# Patient Record
Sex: MALE | Race: WHITE
[De-identification: names, ages, dates, MRNs, and addresses within clinical notes are randomized per-mention and may not be internally consistent; named-entity substitution may affect disease eponyms.]

---

## 2018-03-16 ENCOUNTER — HOSPITAL ENCOUNTER (EMERGENCY)
Dept: HOSPITAL 97 - ER | Age: 83
Discharge: HOME | End: 2018-03-16
Payer: COMMERCIAL

## 2018-03-16 DIAGNOSIS — I10: ICD-10-CM

## 2018-03-16 DIAGNOSIS — Z79.82: ICD-10-CM

## 2018-03-16 DIAGNOSIS — Z95.818: ICD-10-CM

## 2018-03-16 DIAGNOSIS — E78.5: ICD-10-CM

## 2018-03-16 DIAGNOSIS — N45.2: Primary | ICD-10-CM

## 2018-03-16 LAB
ALBUMIN SERPL BCP-MCNC: 3.9 G/DL (ref 3.2–5.5)
ALP SERPL-CCNC: 62 IU/L (ref 42–121)
ALT SERPL W P-5'-P-CCNC: 27 IU/L (ref 10–60)
AST SERPL W P-5'-P-CCNC: 27 IU/L (ref 10–42)
BUN BLD-MCNC: 23 MG/DL (ref 6–20)
GLUCOSE SERPLBLD-MCNC: 96 MG/DL (ref 65–120)
HCT VFR BLD CALC: 48 % (ref 39.6–49)
LYMPHOCYTES # SPEC AUTO: 1.2 K/UL (ref 0.7–4.9)
MCH RBC QN AUTO: 30.4 PG (ref 27–35)
MCV RBC: 90.3 FL (ref 80–100)
PMV BLD: 8.2 FL (ref 7.6–11.3)
POTASSIUM SERPL-SCNC: 4.2 MEQ/L (ref 3.6–5)
RBC # BLD: 5.31 M/UL (ref 4.33–5.43)
UA COMPLETE W REFLEX CULTURE PNL UR: (no result)
UA DIPSTICK W REFLEX MICRO PNL UR: (no result)

## 2018-03-16 PROCEDURE — 85025 COMPLETE CBC W/AUTO DIFF WBC: CPT

## 2018-03-16 PROCEDURE — 81015 MICROSCOPIC EXAM OF URINE: CPT

## 2018-03-16 PROCEDURE — 81003 URINALYSIS AUTO W/O SCOPE: CPT

## 2018-03-16 PROCEDURE — 36415 COLL VENOUS BLD VENIPUNCTURE: CPT

## 2018-03-16 PROCEDURE — 76870 US EXAM SCROTUM: CPT

## 2018-03-16 PROCEDURE — 99284 EMERGENCY DEPT VISIT MOD MDM: CPT

## 2018-03-16 PROCEDURE — 80053 COMPREHEN METABOLIC PANEL: CPT

## 2018-03-16 NOTE — XMS REPORT
Patient Summary Document

 Created on:2018



Patient:ALYSIA RUBIN

Sex:Male

:1935

External Reference #:[id number]





Demographics







 Address   [address]



   [address]



   [place] [address]

 

 Home Phone  [phone]

 

 Mobile Phone  [phone]

 

 Email Address  [email or web address]

 

 Preferred Language  Unknown

 

 Marital Status  Unknown

 

 [employer] Affiliation  Unknown

 

 Race  Unknown

 

 Additional Race(s)  Unavailable

 

 Ethnic Group  Unknown









Author







 Organization  [facility]

 

 Address  [address]



   [place] [address]

 

 Phone  [phone]









Care Team Providers







 Name  Role  Phone

 

 UNKNOWN,  REFFERING  Primary Care Provider  Unavailable

 

 MADELIN GALLARDO  Unavailable  Unavailable









Problems

This patient has no known problems.



Allergies, Adverse Reactions, Alerts

This patient has no known allergies or adverse reactions.



Medications

This patient has no known medications.



Results







 Test Description  Test Time  Test Comments  Text Results  Atomic Results  
Result Comments









 Prostatic Specific Ag. (PSA)  2017 20:50:00    









   Test Item  Value  Reference Range  Comments









 PSA (test code=PSA)  8.02 ng/mL  0.000-6.220  (NOTE)Prostate Specific Antigen 
Test



       Information:The Total PSA method is



       approved for use as an aid in the



       detection ofprostate cancer when used in



       conjunction with a digital rectal exam



       inmen age 50 and older. The total PSA



       method is also indicated for theserial



       measurement of PSA to aid in the prognosis



       and management ofprostate cancer



       patients.Elevated PSA concentrations can



       only suggest the presence of



       prostatecancer until biopsy is performed.



       PSA concentration can also be elevatedin



       benign prostatic hyperplasia or



       inflammatory conditions of theprostate.



       PSA is generally not elevated in healthy



       men or men withnon-prostatic carcinoma.



Thyroid Stimulating Hormone (TSH)2017 20:49:00





 Test Item  Value  Reference Range  Comments

 

 TSH (test code=TSH)  2.05 mIU/mL  0.270-4.200  



Lipid [id number] 20:46:00





 Test Item  Value  Reference Range  Comments

 

 Cholesterol (test  152 mg/dL  0-200  



 code=CHOL)      

 

 Triglycerides (test  291 mg/dL  9-200  



 code=TRIG)      

 

 HDL (test code=HDL)  35 mg/dL  40-60  

 

 Chol/HDL (test  4.3 Ratio  0.0-5.0  



 code=CHOLPHDL)      

 

 LDL, Calculated (test  59  0-130  (NOTE)RISK OF HEART



 code=LDLC)      DISEASEPublished by American



       Heart AssociationAnalyte



               Optimal



       Boderline            Increased



       RiskCHOL



       &lt;200                 200-239



                        &gt;240TRIG



                      &lt;150



             150-199



       &gt;200HDL Male:



       &gt;60



                             &lt;40HDL



       Female:       &gt;60



       



            &lt;50LDL



         &lt;100                130-159



                          &gt;160LDL



                         NEAR OPTIMAL



       -129

 

 VLDL (test code=VLDL)  58 mg/dL  5-40  

 

 LDL/HDL (test code=LDLPHDL)  2    



Comprehensive Metabolic [id number] 20:46:00





 Test Item  Value  Reference Range  Comments

 

 Sodium (test code=NA)  133 mmol/L  135-145  

 

 Potassium (test code=K)  4.5 mmol/L  3.5-5.1  

 

 Chloride (test code=CL)  98 mmol/L    

 

 Carbon Dioxide (test  21 mmol/L  22-29  



 code=CO2)      

 

 Glucose (test code=GLU)  115 mg/dL    

 

 Blood Urea Nitrogen  27 mg/dL  8-23  



 (test code=BUN)      

 

 Creatinine (test  1.2 mg/dL  0.7-1.2  



 code=CREAT)      

 

 Calcium (test code=CA)  9.5 mg/dL  8.3-10.5  

 

 Prot Total (test  6.4 g/dL  6.4-8.3  



 code=TP)      

 

 Albumin (test code=ALB)  4.2 g/dL  3.5-5.2  

 

 A/G Ratio (test  1.9 Ratio    



 code=AGRATIO)      

 

 Globulin (test  2.2  2.9-3.1  



 code=GLOB)      

 

 Bili Total (test  0.6 mg/dL  0.1-0.9  



 code=TBIL)      

 

 Alk Phos (test  72 U/L    



 code=APHOS)      

 

 AST (test code=AST)  19 U/L  1-40  

 

 ALT (test code=ALT)  21 U/L  1-41  

 

 BUN/Creatinine Ratio  22.5    



 (test code=BCRATIO)      

 

 Anion Gap (test  14 mmol/L  7-16  



 code=AGAP)      

 

 Estimated GFR (test  &gt;60    eGFR (estimated Glomerular



 code=GFR)  mL/min/1.73m2    Filtration Rate) is an



       estimated value,calculated



       from the patient's serum



       creatinine using the MDRD



       equation.It is NOT the



       patient's actual GFR. The



       eGFR provides a more



       clinicallyuseful measure



       of kidney disease than



       serum creatinine



       alone.***This calculation



       takes sex and race into



       account, if the



       informationis provided. If



       the race is not provided,



       and the patient



       isAfrican-American,



       multiply by 1.212. If sex



       is not provided, and



       thepatient is female,



       multiply by 0.742. Results



       for patients &lt;18 years



       ofage have not been



       validated by the MDRD



       study and should be



       interpretedwith



       caution.eGFR Result



       Interpretation:eGFR &gt;



       or=60 is in the Normal



       RangeeGFR &lt; 60 may mean



       kidney diseaseeGFR &lt; 15



       may mean kidney



       failure***Ranges



       recommended by the



       National Kidney



       Foundation,http://nkdep.ni



       h.gov



CBC with [id number] 20:06:00





 Test Item  Value  Reference Range  Comments

 

 WBC (test code=WBC)  8.6 K/cumm  4.4-10.5  

 

 RBC (test code=RBC)  4.98 M/cumm  4.10-5.70  

 

 Hemoglobin (test code=HGB)  15.1 gm/dL  13.4-17.4  

 

 Hematocrit (test code=HCT)  45.2 %  38.7-52.0  

 

 MCV (test code=MCV)  90.8 fL    

 

 MCH (test code=MCH)  30.2 pg  27.0-32.5  

 

 MCHC (test code=MCHC)  33.3 g/dL  32.0-37.5  

 

 RDW (test code=RDW)  14.7 %  11.5-14.5  

 

 Platelet Count (test code=PLTCT)  230 K/cumm  140-440  

 

 MPV (test code=MPV)  8.6 fL    

 

 Diff Method (test code=DIFFM)  Auto    

 

 Neutrophil (test code=NEUT)  68.8 %  36-70  

 

 Lymphocyte (test code=LYMPH)  15.4 %  12-44  

 

 Monocyte (test code=MONO)  11.2 %  0-11  

 

 Eosinophil (test code=EOS)  3.7 %  0-7  

 

 Basophil (test code=BASO)  0.9 %  0-2  

 

 Neutro Abs (test code=ANEUT)  5.9 K/cumm  1.6-7.4  

 

 Lymph Abs (test code=ALYMPH)  1.3 K/cumm  0.5-4.6  

 

 Mono Abs (test code=AMONO)  1.0 K/cumm  0.0-1.2  

 

 Eos Abs (test code=AEOS)  0.31 K/cumm  0.00-0.74  

 

 Baso Abs (test code=ABASO)  0.1 K/cumm  0.00-0.21

## 2018-03-16 NOTE — EDPHYS
Physician Documentation                                                                           

 [facility]                                                                

Name: Christian Gomez                                                                                 

Age: 82 yrs                                                                                       

Sex: Male                                                                                         

: 1935                                                                                   

MRN: [id number]                                                                                   

Arrival Date: 2018                                                                          

Time: 12:30                                                                                       

Account#: [id number]                                                                            

Bed 24                                                                                            

Private MD:                                                                                       

ED Physician Hardy Gregg                                                                    

HPI:                                                                                              

                                                                                             

15:52 This 82 yrs old  Male presents to ER via Ambulatory with complaints of         ma2 

      Testicular Pain.                                                                            

15:52 The patient presents with tenderness, that is mild, of the left testicle. Onset: The    ma2 

      symptoms/episode began/occurred gradually, 2 day(s) ago. Associated signs and symptoms:     

      Pertinent positives: Pertinent negatives: abdominal pain, constipation, diarrhea,           

      dysuria, fever, hematuria, nausea. Severity of symptoms: At their worst the symptoms        

      were mild. hx of prostate ca, had right testicular pain for 2 weeks that resolved and       

      now with left testicular pain x 2 days no swelling no inguinal swelling or mass .           

                                                                                                  

Historical:                                                                                       

- Allergies:                                                                                      

12:41 No Known Allergies;                                                                     tw2 

- Home Meds:                                                                                      

12:41 clopidogrel 75 mg oral tab 1 tab once daily [Active]; metoprolol tartrate 50 mg Oral    tw2 

      tab 2 tabs once daily [Active]; lisinopril 40 mg Oral tab 1 tab once daily [Active];        

      pantoprazole 40 mg oral TbEC 1 tab once daily [Active]; atorvastatin 80 mg oral tab 1       

      tab once daily [Active]; aspirin 325 mg Oral TbEC 1 tab once daily [Active]; isosorbide     

      mononitrate 30 mg Oral Tb24 1 tab once daily [Active];                                      

- PMHx:                                                                                           

12:41 Hypertension; Hyperlipidemia;                                                           tw2 

12:42 Heart attacks;                                                                          tw2 

- PSHx:                                                                                           

12:41 heart cath with stents; Tonsillectomy;                                                  tw2 

                                                                                                  

- Immunization history:: Adult Immunizations up to date.                                          

- Social history:: Smoking status: Patient/guardian denies using tobacco.                         

                                                                                                  

                                                                                                  

ROS:                                                                                              

15:52 Constitutional: Negative for fever, chills, and weight loss, Eyes: Negative for injury, ma2 

      pain, redness, and discharge, ENT: Negative for injury, pain, and discharge, Neck:          

      Negative for injury, pain, and swelling, Cardiovascular: Negative for chest pain,           

      palpitations, and edema, Respiratory: Negative for shortness of breath, cough,              

      wheezing, and pleuritic chest pain, Abdomen/GI: Negative for abdominal pain, nausea,        

      diarrhea, and constipation, Back: Negative for injury and pain, MS/Extremity: Negative      

      for injury and deformity, Skin: Negative for injury, rash, and discoloration, Neuro:        

      Negative for headache, weakness, numbness, tingling, and seizure, Psych: Negative for       

      depression, anxiety, suicide ideation, homicidal ideation, and hallucinations,              

      Allergy/Immunology: Negative for hives, rash, and allergies, Endocrine: Negative for        

      neck swelling, polydipsia, polyuria, polyphagia, and marked weight changes.                 

                                                                                                  

Exam:                                                                                             

15:52 Constitutional:  This is a well developed, well nourished patient who is awake, alert,  ma2 

      and in no acute distress. Head/Face:  Normocephalic, atraumatic. Chest/axilla:  Normal      

      chest wall appearance and motion.  Nontender with no deformity.  No lesions are             

      appreciated. Cardiovascular:  Regular rate and rhythm with a normal S1 and S2.  No          

      gallops, murmurs, or rubs.  Normal PMI, no JVD.  No pulse deficits. Respiratory:  Lungs     

      have equal breath sounds bilaterally, clear to auscultation and percussion.  No rales,      

      rhonchi or wheezes noted.  No increased work of breathing, no retractions or nasal          

      flaring. Abdomen/GI:  Soft, non-tender, with normal bowel sounds.  No distension or         

      tympany.  No guarding or rebound.  No evidence of tenderness throughout. Male :           

      right tisticular tenderness, no swelling no skin rash  with no discharge or lesions.        

                                                                                                  

Vital Signs:                                                                                      

12:37  / 80; Pulse 59; Resp 16; Temp 97.9(O); Pulse Ox 100% on R/A; Weight 90.72 kg     tw2 

      (R); Height 5 ft. 7 in. (170.18 cm); Pain 4/10;                                             

14:00  / 82; Pulse 58; Resp 16; Pulse Ox 99% on R/A;                                    kr2 

15:10  / 75; Pulse 60; Resp 17; Pulse Ox 99% on R/A;                                    kr2 

16:25  / 71; Pulse 60; Resp 17; Pulse Ox 99% on R/A;                                    kr2 

12:37 Body Mass Index 31.32 (90.72 kg, 170.18 cm)                                             tw2 

                                                                                                  

MDM:                                                                                              

13:08 Patient medically screened.                                                             [facility] 

15:52 Differential diagnosis: UTI, prostatitis, urethritis, torsion, orchitis vs epidydmitis. ma2 

      Data reviewed: vital signs, nurses notes, EMS record, radiologic studies. Counseling: I     

      had a detailed discussion with the patient and/or guardian regarding: the historical        

      points, exam findings, and any diagnostic results supporting the discharge/admit            

      diagnosis, the presence of at least one elevated blood pressure reading (>120/80)           

      during this emergency department visit, the need for outpatient follow up.                  

                                                                                                  

                                                                                             

13:53 Order name: UA                                                                          [facility] 

                                                                                             

13:53 Order name: CMP                                                                         [facility] 

                                                                                             

13:53 Order name: CBC with Diff                                                               [facility] 

                                                                                             

14:11 Order name: Urine Dipstick-Ancillary; Complete Time: 14:35                              EDMS

                                                                                             

14:42 Order name: Urinalysis; Complete Time: 15:51                                            EDMS

                                                                                             

14:56 Order name: CBC with Automated Diff; Complete Time: 15:51                               EDMS

                                                                                             

13:53 Order name: US Scrotum Testicles                                                        [facility] 

                                                                                             

15:07 Order name: Comprehensive Metabolic Panel; Complete Time: 15:51                         EDMS

                                                                                             

15:11 Order name: Urine Microscopic Only; Complete Time: 15:51                                EDMS

                                                                                             

15:51 Order name: US; Complete Time: 15:51                                                    EDMS

                                                                                                  

Administered Medications:                                                                         

No medications were administered                                                                  

                                                                                                  

                                                                                                  

Disposition:                                                                                      

18 15:57 Discharged to Home. Impression: Orchitis.                                          

- Condition is Stable.                                                                            

- Discharge Instructions: [place].                                                               

- Prescriptions for Tylenol- Codeine #3 300-30 mg Oral Tablet - take 2 tablet by ORAL             

  route every 6 hours As needed; 6 tablet. Cipro 500 mg Oral Tablet - take 1 tablet by            

  ORAL route every 12 hours for 7 days; 14 tablet.                                                

- Medication Reconciliation Form, Thank You Letter, Antibiotic Education, Prescription            

  Opioid Use form.                                                                                

- Follow up: Private Physician; When: Tomorrow; Reason: Continuance of care.                      

- Problem is new.                                                                                 

- Symptoms have improved.                                                                         

                                                                                                  

                                                                                                  

                                                                                                  

Signatures:                                                                                       

Dispatcher [employer]                           EDMS                                                 

Radha Valdovinos RN                          RN   tw2                                                  

Mariya Chawla RN                       RN   kr2                                                  

Hardy Gregg MD MD   ma2                                                  

                                                                                                  

**************************************************************************************************

## 2018-03-16 NOTE — ER
Nurse's Notes                                                                                     

 [facility]                                                                

Name: Christian Gomez                                                                                 

Age: 82 yrs                                                                                       

Sex: Male                                                                                         

: 1935                                                                                   

MRN: [id number]                                                                                   

Arrival Date: 2018                                                                          

Time: 12:30                                                                                       

Account#: [id number]                                                                            

Bed 24                                                                                            

Private MD:                                                                                       

Diagnosis: Orchitis                                                                               

                                                                                                  

Presentation:                                                                                     

                                                                                             

12:36 Presenting complaint: Patient states: "i have pain in my left testicle that started     tw2 

      yesterday", "pain in the general area 3 or 4 weeks", saw Dr. Zee 3/8/18. Transition       

      of care: patient was not received from another setting of care. Onset of symptoms was       

      2018. Care prior to arrival: None.                                                

12:36 Method Of Arrival: Ambulatory                                                           tw2 

12:36 Acuity: LEONARD 3                                                                           tw2 

                                                                                                  

Triage Assessment:                                                                                

12:38 General: Appears in no apparent distress. Behavior is calm, cooperative, appropriate    tw2 

      for age. Pain: Complains of pain in "left testicle".                                        

12:38 Pain: Aggravated by increased activity.                                                 tw2 

                                                                                                  

Historical:                                                                                       

- Allergies:                                                                                      

12:41 No Known Allergies;                                                                     tw2 

- Home Meds:                                                                                      

12:41 clopidogrel 75 mg oral tab 1 tab once daily [Active]; metoprolol tartrate 50 mg Oral    tw2 

      tab 2 tabs once daily [Active]; lisinopril 40 mg Oral tab 1 tab once daily [Active];        

      pantoprazole 40 mg oral TbEC 1 tab once daily [Active]; atorvastatin 80 mg oral tab 1       

      tab once daily [Active]; aspirin 325 mg Oral TbEC 1 tab once daily [Active]; isosorbide     

      mononitrate 30 mg Oral Tb24 1 tab once daily [Active];                                      

- PMHx:                                                                                           

12:41 Hypertension; Hyperlipidemia;                                                           tw2 

12:42 Heart attacks;                                                                          tw2 

- PSHx:                                                                                           

12:41 heart cath with stents; Tonsillectomy;                                                  tw2 

                                                                                                  

- Immunization history:: Adult Immunizations up to date.                                          

- Social history:: Smoking status: Patient/guardian denies using tobacco.                         

                                                                                                  

                                                                                                  

Screenin:17 Abuse screen: Denies threats or abuse. Denies injuries from another. Nutritional        kr2 

      screening: No deficits noted. Tuberculosis screening: No symptoms or risk factors           

      identified. Fall Risk None identified.                                                      

                                                                                                  

Assessment:                                                                                       

13:14 General: Appears in no apparent distress. comfortable, well groomed, well developed,    kr2 

      well nourished, Behavior is calm, cooperative, appropriate for age. Pain: Complains of      

      pain in right and left testicles Pain currently is 5 out of 10 on a pain scale. Quality     

      of pain is described as dull, Is continuous. Neuro: Level of Consciousness is awake,        

      alert, obeys commands, Oriented to person, place, time, situation, Appropriate for age.     

      Cardiovascular: Capillary refill < 3 seconds in bilateral fingers Patient's skin is         

      warm and dry. Respiratory: Reports he was just treated for pneumonia last week Airway       

      is patent Respiratory effort is even, unlabored, Respiratory pattern is regular,            

      symmetrical, Breath sounds are clear bilaterally. GI: Abdomen is flat, non-distended.       

      : Urine is clear, Reports pain testicle, he has prostate cancer Denies inability to       

      void. EENT: No deficits noted. No signs and/or symptoms were reported regarding the         

      EENT system. Derm: Skin is intact, with poor turgor Skin is pink, warm \T\ dry.             

      Musculoskeletal: Circulation, motion, and sensation intact.                                 

15:09 Reassessment: Patient appears in no apparent distress at this time. Patient and/or      kr2 

      family updated on plan of care and expected duration. Pain level reassessed. Patient is     

      alert, oriented x 3, equal unlabored respirations, skin warm/dry/pink. Patient taken to     

      ultrasound per tech at this time.                                                           

16:25 Reassessment: Patient appears in no apparent distress at this time. Patient and/or      kr2 

      family updated on plan of care and expected duration. Pain level reassessed. Patient is     

      alert, oriented x 3, equal unlabored respirations, skin warm/dry/pink. Patient states       

      feeling better.                                                                             

                                                                                                  

Vital Signs:                                                                                      

12:37  / 80; Pulse 59; Resp 16; Temp 97.9(O); Pulse Ox 100% on R/A; Weight 90.72 kg     tw2 

      (R); Height 5 ft. 7 in. (170.18 cm); Pain 4/10;                                             

14:00  / 82; Pulse 58; Resp 16; Pulse Ox 99% on R/A;                                    kr2 

15:10  / 75; Pulse 60; Resp 17; Pulse Ox 99% on R/A;                                    kr2 

16:25  / 71; Pulse 60; Resp 17; Pulse Ox 99% on R/A;                                    kr2 

12:37 Body Mass Index 31.32 (90.72 kg, 170.18 cm)                                             tw2 

                                                                                                  

ED Course:                                                                                        

12:30 Patient arrived in ED.                                                                  rg4 

12:37 Triage completed.                                                                       tw2 

12:41 Arm band placed on.                                                                     tw2 

13:08 Hardy Gregg MD is Attending Physician.                                           ma2 

13:14 Mariya Chawla, RN is Primary Nurse.                                                     kr2 

13:17 Patient has correct armband on for positive identification. Bed in low position. Call   kr2 

      light in reach. Side rails up X 1. Adult w/ patient. Pulse ox on. NIBP on. Door closed.     

      Warm blanket given. Head of bed elevated.                                                   

14:10 Inserted saline lock: 22 gauge in left antecubital area, using aseptic technique. Blood kr2 

      collected.                                                                                  

16:24 No provider procedures requiring assistance completed. IV discontinued, intact,         kr2 

      bleeding controlled, No redness/swelling at site. Pressure dressing applied.                

                                                                                                  

Administered Medications:                                                                         

No medications were administered                                                                  

                                                                                                  

                                                                                                  

Outcome:                                                                                          

15:57 Discharge ordered by MD.                                                                ma2 

16:24 Discharged to home ambulatory, with family.                                             kr2 

16:24 Condition: good                                                                             

16:24 Discharge instructions given to patient, family, Instructed on discharge instructions,      

      follow up and referral plans. medication usage, Demonstrated understanding of               

      instructions, follow-up care, medications, Prescriptions given X 2.                         

16:26 Patient left the ED.                                                                    kr2 

                                                                                                  

Signatures:                                                                                       

Radha Valdovinos RN                          RN   tw2                                                  

Kaylah Brooks                                 rg4                                                  

Mariya Chawla, RN                       RN   kr2                                                  

Hardy Gregg MD MD   [facility]                                                  

                                                                                                  

**************************************************************************************************